# Patient Record
Sex: FEMALE | Race: BLACK OR AFRICAN AMERICAN | NOT HISPANIC OR LATINO | Employment: FULL TIME | URBAN - METROPOLITAN AREA
[De-identification: names, ages, dates, MRNs, and addresses within clinical notes are randomized per-mention and may not be internally consistent; named-entity substitution may affect disease eponyms.]

---

## 2023-11-27 PROBLEM — M51.369 DDD (DEGENERATIVE DISC DISEASE), LUMBAR: Status: ACTIVE | Noted: 2023-11-27

## 2024-04-19 ENCOUNTER — CLINICAL SUPPORT (OUTPATIENT)
Dept: FAMILY MEDICINE CLINIC | Facility: CLINIC | Age: 59
End: 2024-04-19
Payer: COMMERCIAL

## 2024-04-19 DIAGNOSIS — Z23 ENCOUNTER FOR IMMUNIZATION: Primary | ICD-10-CM

## 2024-06-07 ENCOUNTER — OFFICE VISIT (OUTPATIENT)
Dept: FAMILY MEDICINE CLINIC | Facility: CLINIC | Age: 59
End: 2024-06-07
Payer: COMMERCIAL

## 2024-06-07 VITALS
OXYGEN SATURATION: 96 % | BODY MASS INDEX: 29.22 KG/M2 | SYSTOLIC BLOOD PRESSURE: 102 MMHG | RESPIRATION RATE: 16 BRPM | DIASTOLIC BLOOD PRESSURE: 71 MMHG | HEART RATE: 77 BPM | WEIGHT: 158.8 LBS | HEIGHT: 62 IN

## 2024-06-07 DIAGNOSIS — R20.2 PARESTHESIA OF LEFT FOOT: ICD-10-CM

## 2024-06-07 DIAGNOSIS — M79.672 LEFT FOOT PAIN: Primary | ICD-10-CM

## 2024-06-07 DIAGNOSIS — R20.2 PARESTHESIA: ICD-10-CM

## 2024-06-07 DIAGNOSIS — R73.9 HYPERGLYCEMIA: ICD-10-CM

## 2024-06-07 DIAGNOSIS — M54.16 LUMBAR RADICULOPATHY: ICD-10-CM

## 2024-06-07 PROCEDURE — 99214 OFFICE O/P EST MOD 30 MIN: CPT | Performed by: FAMILY MEDICINE

## 2024-06-07 RX ORDER — MELOXICAM 15 MG/1
15 TABLET ORAL
COMMUNITY
Start: 2024-04-15 | End: 2024-06-14

## 2024-06-07 NOTE — PATIENT INSTRUCTIONS
As requested, I gave you the x-ray order.    I have ordered lab tests today.  You should receive a phone call or a [employer] message with those results.  If you have not heard from us in 7-10 days, please call the office.      Keep up your healthy diet and just watch portions.

## 2024-06-07 NOTE — PROGRESS NOTES
"Subjective     Jodie Desai is a 59 y.o. female who presents with   Chief Complaint   Patient presents with    Foot Pain     Hurts to walk        Foot Pain     Left foot at the base of the big toe are hurting when walking barefoot.  She also gets a sensation in the big toe as well.  She's also had issues with her back and is finishing physical therapy ([facility]) with a lot of improvement.  She's seeing Dr. Crowell (ortho) for that at  and really likes him.     She has warm flushing on the right side of her chest and also has to urinate when didn't feel it until just then.  She'll go pee and do some deep breathing and it goes away.     Hyperglycemia for many years and stable for years.  Eats a healthy diet to help control this.              Review of Systems     Objective     /71 (BP Location: Right arm, Patient Position: Sitting, Cuff Size: Adult)   Pulse 77   Resp 16   Ht 157.5 cm (62\")   Wt 72 kg (158 lb 12.8 oz)   SpO2 96%   Breastfeeding No   BMI 29.04 kg/m²     Physical Exam  Constitutional:       Appearance: Normal appearance.   Musculoskeletal:         General: Tenderness (at the first MCP on the left foot) and deformity (small bunions bilaterally and worse on the left) present.   Neurological:      Mental Status: She is alert.   Psychiatric:         Behavior: Behavior normal.         Thought Content: Thought content normal.         Procedures     Assessment & Plan   Diagnoses and all orders for this visit:    1. Left foot pain (Primary)  -     XR Foot 3+ View Left; Future    2. Lumbar radiculopathy  Assessment & Plan:  Finishing physical therapy with help.  Doing well with Dr. Crowell.      3. Paresthesia of left foot    4. Paresthesia  Assessment & Plan:  Right chest wall and related to urination    Orders:  -     Comprehensive Metabolic Panel  -     CBC & Differential  -     TSH  -     Vitamin B12    5. Hyperglycemia  -     Hemoglobin A1c       BMI is >= 25 and <30. " (Overweight) The following options were offered after discussion;: information on healthy weight added to patient's after visit summary      Discussion    Patient Instructions   As requested, I gave you the x-ray order.    I have ordered lab tests today.  You should receive a phone call or a [employer] message with those results.  If you have not heard from us in 7-10 days, please call the office.      Keep up your healthy diet and just watch portions.               Keyla Larsen MD

## 2024-06-08 LAB
ALBUMIN SERPL-MCNC: 4.5 G/DL (ref 3.5–5.2)
ALBUMIN/GLOB SERPL: 2.3 G/DL
ALP SERPL-CCNC: 101 U/L (ref 39–117)
ALT SERPL-CCNC: 13 U/L (ref 1–33)
AST SERPL-CCNC: 21 U/L (ref 1–32)
BASOPHILS # BLD AUTO: 0.07 10*3/MM3 (ref 0–0.2)
BASOPHILS NFR BLD AUTO: 1.1 % (ref 0–1.5)
BILIRUB SERPL-MCNC: 0.4 MG/DL (ref 0–1.2)
BUN SERPL-MCNC: 14 MG/DL (ref 6–20)
BUN/CREAT SERPL: 16.7 (ref 7–25)
CALCIUM SERPL-MCNC: 10.2 MG/DL (ref 8.6–10.5)
CHLORIDE SERPL-SCNC: 104 MMOL/L (ref 98–107)
CO2 SERPL-SCNC: 27.5 MMOL/L (ref 22–29)
CREAT SERPL-MCNC: 0.84 MG/DL (ref 0.57–1)
EGFRCR SERPLBLD CKD-EPI 2021: 81.2 ML/MIN/1.73
EOSINOPHIL # BLD AUTO: 0.16 10*3/MM3 (ref 0–0.4)
EOSINOPHIL NFR BLD AUTO: 2.6 % (ref 0.3–6.2)
ERYTHROCYTE [DISTWIDTH] IN BLOOD BY AUTOMATED COUNT: 13.5 % (ref 12.3–15.4)
GLOBULIN SER CALC-MCNC: 2 GM/DL
GLUCOSE SERPL-MCNC: 91 MG/DL (ref 65–99)
HBA1C MFR BLD: 5.9 % (ref 4.8–5.6)
HCT VFR BLD AUTO: 44.6 % (ref 34–46.6)
HGB BLD-MCNC: 14.5 G/DL (ref 12–15.9)
IMM GRANULOCYTES # BLD AUTO: 0.03 10*3/MM3 (ref 0–0.05)
IMM GRANULOCYTES NFR BLD AUTO: 0.5 % (ref 0–0.5)
LYMPHOCYTES # BLD AUTO: 2.23 10*3/MM3 (ref 0.7–3.1)
LYMPHOCYTES NFR BLD AUTO: 35.6 % (ref 19.6–45.3)
MCH RBC QN AUTO: 28.9 PG (ref 26.6–33)
MCHC RBC AUTO-ENTMCNC: 32.5 G/DL (ref 31.5–35.7)
MCV RBC AUTO: 88.8 FL (ref 79–97)
MONOCYTES # BLD AUTO: 0.63 10*3/MM3 (ref 0.1–0.9)
MONOCYTES NFR BLD AUTO: 10.1 % (ref 5–12)
NEUTROPHILS # BLD AUTO: 3.14 10*3/MM3 (ref 1.7–7)
NEUTROPHILS NFR BLD AUTO: 50.1 % (ref 42.7–76)
NRBC BLD AUTO-RTO: 0 /100 WBC (ref 0–0.2)
PLATELET # BLD AUTO: 372 10*3/MM3 (ref 140–450)
POTASSIUM SERPL-SCNC: 4.7 MMOL/L (ref 3.5–5.2)
PROT SERPL-MCNC: 6.5 G/DL (ref 6–8.5)
RBC # BLD AUTO: 5.02 10*6/MM3 (ref 3.77–5.28)
SODIUM SERPL-SCNC: 142 MMOL/L (ref 136–145)
TSH SERPL DL<=0.005 MIU/L-ACNC: 1.16 UIU/ML (ref 0.27–4.2)
VIT B12 SERPL-MCNC: 597 PG/ML (ref 211–946)
WBC # BLD AUTO: 6.26 10*3/MM3 (ref 3.4–10.8)

## 2024-06-18 ENCOUNTER — TELEPHONE (OUTPATIENT)
Dept: FAMILY MEDICINE CLINIC | Facility: CLINIC | Age: 59
End: 2024-06-18
Payer: OTHER GOVERNMENT

## 2024-06-18 NOTE — TELEPHONE ENCOUNTER
Pt called and said that her foot pain is continuing. It's more of a tingling sensation in her toes. She can't get in with her neurologist. Would you be able to send in a medication or recommend something else for her to do? She is currently trying the foot inserts as recommended and it's not helping much.

## 2024-06-18 NOTE — TELEPHONE ENCOUNTER
Please let her know that Dr. Larsen is out of office until 6/26. If not already done, I recommend checking nerve conduction study to help identify the reason for symptoms.

## 2024-07-23 ENCOUNTER — OFFICE VISIT (OUTPATIENT)
Dept: FAMILY MEDICINE CLINIC | Facility: CLINIC | Age: 59
End: 2024-07-23
Payer: COMMERCIAL

## 2024-07-23 VITALS
HEART RATE: 90 BPM | HEIGHT: 62 IN | OXYGEN SATURATION: 97 % | SYSTOLIC BLOOD PRESSURE: 114 MMHG | TEMPERATURE: 98.8 F | WEIGHT: 162 LBS | DIASTOLIC BLOOD PRESSURE: 64 MMHG | BODY MASS INDEX: 29.81 KG/M2

## 2024-07-23 DIAGNOSIS — R79.89 LOW VITAMIN D LEVEL: ICD-10-CM

## 2024-07-23 DIAGNOSIS — E78.2 MODERATE MIXED HYPERLIPIDEMIA NOT REQUIRING STATIN THERAPY: ICD-10-CM

## 2024-07-23 DIAGNOSIS — J06.9 ACUTE URI: Primary | ICD-10-CM

## 2024-07-23 LAB
EXPIRATION DATE: NORMAL
EXPIRATION DATE: NORMAL
FLUAV AG UPPER RESP QL IA.RAPID: NOT DETECTED
FLUBV AG UPPER RESP QL IA.RAPID: NOT DETECTED
INTERNAL CONTROL: NORMAL
INTERNAL CONTROL: NORMAL
Lab: 7933
Lab: NORMAL
S PYO AG THROAT QL: NEGATIVE
SARS-COV-2 AG UPPER RESP QL IA.RAPID: NOT DETECTED

## 2024-07-23 PROCEDURE — 99214 OFFICE O/P EST MOD 30 MIN: CPT | Performed by: FAMILY MEDICINE

## 2024-07-23 PROCEDURE — 87428 SARSCOV & INF VIR A&B AG IA: CPT | Performed by: FAMILY MEDICINE

## 2024-07-23 PROCEDURE — 87880 STREP A ASSAY W/OPTIC: CPT | Performed by: FAMILY MEDICINE

## 2024-07-23 RX ORDER — DEXTROMETHORPHAN HYDROBROMIDE AND PROMETHAZINE HYDROCHLORIDE 15; 6.25 MG/5ML; MG/5ML
5 SYRUP ORAL 4 TIMES DAILY PRN
Qty: 240 ML | Refills: 0 | Status: SHIPPED | OUTPATIENT
Start: 2024-07-23

## 2024-07-23 RX ORDER — BENZONATATE 200 MG/1
200 CAPSULE ORAL 3 TIMES DAILY PRN
Qty: 30 CAPSULE | Refills: 0 | Status: SHIPPED | OUTPATIENT
Start: 2024-07-23

## 2024-07-23 NOTE — PATIENT INSTRUCTIONS
Upper respiratory infection plan:  - nasal congestion relief with OTC  mucinex, sinus rinse, use of steam shower or humidifier  - sore throat relief with OTC cough drops, spoonfuls of honey, salt water gargle, throat coat tea, hot honey lemon water  - cough relief with OTC cough drops, honey, use of steam shower or humidifier; RX tessalon perles during the day and promethazine cough syrup at night  - pain/inflammation relief with OTC ibuprofen 400mg every 4 hrs with food/water, tylenol 1000mg every 6 hrs with food/water  - you may consider taking elderberry syrup or gummies or zinc supplement to boost immune system  - increase hydration  - Red flags: new fever (100.4+), inability to swallow, one sided severe facial pain or dental pain, new shortness of breath, new chest pain not associated with coughing, blood in phlegm

## 2024-07-23 NOTE — LETTER
July 23, 2024     Patient: Jodie Desai   YOB: 1965   Date of Visit: 7/23/2024       To Whom It May Concern:    It is my medical opinion that Jodie Desai may return to work tomorrow.            Sincerely,        Irlanda Smith MD    CC: No Recipients

## 2024-07-23 NOTE — PROGRESS NOTES
"Chief Complaint  Chief Complaint   Patient presents with    Cough     Sore throat, 3 days        Subjective    History of Present Illness  Jodie Desai is a 59 y.o. female presents to [facility]    History of Present Illness  The patient presents for evaluation of sore throat and cough.    The patient's symptoms initiated on Sunday, characterized by a sore throat, cough, and drainage. She denies experiencing any ear pain, but reports pruritus and tightness in her right nostril. She denies experiencing headaches, shortness of breath, nausea, vomiting, abdominal pain, diarrhea, fever, or chills. She reports mild chest pain when coughing, but denies any hemoptysis. Her appetite remains unaffected, and she has been consuming ginger ale and water. She denies any recent exposure to sick individuals, recent travel, gatherings, or weddings. Her treatment regimen includes Mucinex Cough and lozenges.      Objective   Vitals:    07/23/24 1505   BP: 114/64   Pulse: 90   Temp: 98.8 °F (37.1 °C)   SpO2: 97%   Weight: 73.5 kg (162 lb)   Height: 157.5 cm (62.01\")        Physical Exam  Constitutional:       General: She is not in acute distress.     Appearance: Normal appearance. She is normal weight. She is not ill-appearing.   HENT:      Head: Normocephalic and atraumatic.      Right Ear: Ear canal and external ear normal.      Left Ear: Ear canal and external ear normal.      Ears:      Comments: Clear fluid bilat TM, no bulge or erythema     Nose:      Comments: Slight L frontal sinus tenderness to percussion, no maxillary sinus tenderness to percussion     Mouth/Throat:      Mouth: Mucous membranes are moist.      Pharynx: Posterior oropharyngeal erythema (posterior oropharynx) present. No oropharyngeal exudate.      Comments: + PND  Eyes:      Extraocular Movements: Extraocular movements intact.      Conjunctiva/sclera: Conjunctivae normal.      Pupils: Pupils are equal, round, and " reactive to light.   Pulmonary:      Effort: Pulmonary effort is normal. No respiratory distress.      Breath sounds: Normal breath sounds. No wheezing.      Comments: No crackles  Musculoskeletal:         General: Normal range of motion.      Cervical back: Normal range of motion. No rigidity or tenderness.   Lymphadenopathy:      Cervical: Cervical adenopathy (nontender left cervical LAD) present.   Skin:     General: Skin is warm and dry.      Capillary Refill: Capillary refill takes less than 2 seconds.      Findings: No rash.   Neurological:      General: No focal deficit present.      Mental Status: She is alert and oriented to person, place, and time.   Psychiatric:         Mood and Affect: Mood normal.         Behavior: Behavior normal.         Thought Content: Thought content normal.         Judgment: Judgment normal.          The following data was reviewed by: Irlanda Smith MD on 07/23/2024:  Lab Results   Component Value Date    RAPSCRN Negative 07/23/2024   POCT SARS-CoV-2 Antigen MJ + Flu  Order: [id number]  Status: Final result       Visible to patient: No (scheduled for 7/23/2024  4:35 PM)       Next appt: 08/20/2024 at 03:15 PM in Family Medicine (Keyla Larsen MD)       Dx: Acute URI    Specimen Information: Swab   0 Result Notes       Component  Ref Range & Units 15:35 15:31   SARS Antigen  Not Detected, Presumptive Negative Not Detected    Influenza A Antigen MJ  Not Detected Not Detected    Influenza B Antigen MJ  Not Detected Not Detected           Assessment and Plan  Jodie Desai is a 59 y.o. female presents to [facility] today for 3 days URI symptoms.  Negative strep and COVID in clinic.  Based on short duration of symptoms, do not suspect bacterial sinusitis at this time. Will treat supportive care at this time-Tessalon Perles and promethazine cough syrup at night for cough; AVS with other supportive care strategies.  RTC as  needed.        Diagnoses and all orders for this visit:    1. Acute URI (Primary)  -     POCT SARS-CoV-2 Antigen MJ + Flu  -     POCT rapid strep A  -     benzonatate (TESSALON) 200 MG capsule; Take 1 capsule by mouth 3 (Three) Times a Day As Needed for Cough.  Dispense: 30 capsule; Refill: 0  -     promethazine-dextromethorphan (PROMETHAZINE-DM) 6.25-15 MG/5ML syrup; Take 5 mL by mouth 4 (Four) Times a Day As Needed for Cough. Indications: Cough  Dispense: 240 mL; Refill: 0    2. Low vitamin D level  -     Vitamin D,25-Hydroxy    3. Moderate mixed hyperlipidemia not requiring statin therapy  -     Lipid Panel        Patient voiced understanding and agreement with plan of care and had no further questions or concerns at this time.     Problems addressed:  single self-limited or minor problem, established problem: stable  Complexity: labs ordered yes, labs reviewed yes  Risk: prescription drug management    Irlanda Smith MD  Family Medicine  [facility]      Follow Up  Return for Next scheduled follow up.    Patient Instructions   Upper respiratory infection plan:  - nasal congestion relief with OTC  mucinex, sinus rinse, use of steam shower or humidifier  - sore throat relief with OTC cough drops, spoonfuls of honey, salt water gargle, throat coat tea, hot honey lemon water  - cough relief with OTC cough drops, honey, use of steam shower or humidifier; RX tessalon perles during the day and promethazine cough syrup at night  - pain/inflammation relief with OTC ibuprofen 400mg every 4 hrs with food/water, tylenol 1000mg every 6 hrs with food/water  - you may consider taking elderberry syrup or gummies or zinc supplement to boost immune system  - increase hydration  - Red flags: new fever (100.4+), inability to swallow, one sided severe facial pain or dental pain, new shortness of breath, new chest pain not associated with coughing, blood in phlegm       Patient or patient representative verbalized  consent for the use of Ambient Listening during the visit with  Irlanda Smith MD for chart documentation. 7/23/2024  16:02 EDT

## 2024-07-24 LAB
25(OH)D3+25(OH)D2 SERPL-MCNC: 22.4 NG/ML (ref 30–100)
CHOLEST SERPL-MCNC: 192 MG/DL (ref 0–200)
HDLC SERPL-MCNC: 58 MG/DL (ref 40–60)
LDLC SERPL CALC-MCNC: 120 MG/DL (ref 0–100)
TRIGL SERPL-MCNC: 76 MG/DL (ref 0–150)
VLDLC SERPL CALC-MCNC: 14 MG/DL (ref 5–40)

## 2024-07-25 NOTE — PROGRESS NOTES
Hello!    Here are the results of your most recent labs:    Your vitamin D is still low. I recommend supplementing with OTC vitamin D 1000 IU daily for 3-6 months, then take 400 IU daily or a prenatal vitamin.     Your cholesterol was elevated.  For diet and lifestyle intervention, I recommend decreasing intake of trans and saturated fats, and red meat.  Increase physical activity as tolerated.  You may try supplementing with omega-3 1-2g daily, berberine 500mg daily, and/or whole flaxseed if desired.    ASCVD risk: The 10-year ASCVD risk score (Carson GORDON, et al., 2019) is: 2.5%    Values used to calculate the score:      Age: 59 years      Sex: Female      Is Non- : Yes      Diabetic: No      Tobacco smoker: No      Systolic Blood Pressure: 114 mmHg      Is BP treated: No      HDL Cholesterol: 58 mg/dL      Total Cholesterol: 192 mg/dL    At this time a statin is not recommended.     Please continue your current medications.  Please contact me with any questions.    Thank you!  Dr. Smith

## 2024-10-15 ENCOUNTER — TELEPHONE (OUTPATIENT)
Dept: FAMILY MEDICINE CLINIC | Facility: CLINIC | Age: 59
End: 2024-10-15
Payer: OTHER GOVERNMENT

## 2024-10-15 NOTE — TELEPHONE ENCOUNTER
"She came in for a physical with Dr. Domingo on 10/15/24. Advised this is not corrected because she is a [facility] patient and physical are only scheduled with their PCP. The appointment is now scheduled in June with Dr. Larsen. She was upset also because if was scheduled pap and she never has her pap done here.I did tell her that can be corrected. The 10/15/24 appt was cancelled by the , because the patient scheduled it incorrectly through [employer]. She just kept going on and on about she never got any notification about the appt be cancelled. It was still on her phone calendar and she missed 2 hours of work. I did advise her notification are sent via [employer], email, and/or text. Stated she should not have to check that. Refused to move out of the way to let other patient check in for her appointment, and stated \"like always since becoming [facility] every time she comes in things are always messed up\".     "

## 2025-02-05 ENCOUNTER — OFFICE VISIT (OUTPATIENT)
Dept: FAMILY MEDICINE CLINIC | Facility: CLINIC | Age: 60
End: 2025-02-05
Payer: COMMERCIAL

## 2025-02-05 VITALS
HEART RATE: 92 BPM | SYSTOLIC BLOOD PRESSURE: 120 MMHG | WEIGHT: 167.9 LBS | OXYGEN SATURATION: 99 % | DIASTOLIC BLOOD PRESSURE: 76 MMHG | BODY MASS INDEX: 30.9 KG/M2 | HEIGHT: 62 IN

## 2025-02-05 DIAGNOSIS — J02.9 PHARYNGITIS, UNSPECIFIED ETIOLOGY: Primary | ICD-10-CM

## 2025-02-05 LAB
EXPIRATION DATE: NORMAL
INTERNAL CONTROL: NORMAL
Lab: NORMAL
S PYO AG THROAT QL: NORMAL

## 2025-02-05 PROCEDURE — 99213 OFFICE O/P EST LOW 20 MIN: CPT | Performed by: FAMILY MEDICINE

## 2025-02-05 PROCEDURE — 87880 STREP A ASSAY W/OPTIC: CPT | Performed by: FAMILY MEDICINE

## 2025-02-05 RX ORDER — FLUTICASONE PROPIONATE 50 MCG
2 SPRAY, SUSPENSION (ML) NASAL DAILY
Qty: 30 G | Refills: 3 | Status: SHIPPED | OUTPATIENT
Start: 2025-02-05

## 2025-02-05 NOTE — LETTER
February 5, 2025     Patient: Jodie Desai   YOB: 1965   Date of Visit: 2/5/2025       To Whom It May Concern:     Jodie Desai was seen today for illness.         Sincerely,        Keyla Larsen MD    CC: No Recipients

## 2025-02-05 NOTE — PATIENT INSTRUCTIONS
Salt water gargles and Mucinex may help with congestion and drainage.  Continue to use Tylenol Aleve, Astelin and Flonase.  Most viral respiratory infections will run their course in 7 to 10 days.

## 2025-02-05 NOTE — PROGRESS NOTES
"Subjective     Jodie Desai is a 59 y.o. female who presents with   Chief Complaint   Patient presents with    Sore Throat     Going into ear, since Sunday        Sore Throat          Sudden onset of chills and fatigue 4 days ago and the following morning started having congestion and headache.  Using Tylenol, Aleve and Flonase.  She's not eating and does not have an appetite.   She took a home covid and flu test that were negative 3 days ago.  She has some itch in her throat and ear and some peeling at the roof of her mouth.              Review of Systems   HENT:  Positive for sore throat.         Objective     /76   Pulse 92   Ht 157.5 cm (62\")   Wt 76.2 kg (167 lb 14.4 oz)   SpO2 99%   Breastfeeding No   BMI 30.71 kg/m²     Physical Exam  Constitutional:       Appearance: Normal appearance.   HENT:      Right Ear: Tympanic membrane normal.      Left Ear: Tympanic membrane normal.      Nose: Congestion present.      Mouth/Throat:      Pharynx: Posterior oropharyngeal erythema present.   Pulmonary:      Effort: Pulmonary effort is normal.      Breath sounds: Normal breath sounds.   Musculoskeletal:      Cervical back: Tenderness (left submandibular nodes) present.   Neurological:      Mental Status: She is alert.   Psychiatric:         Behavior: Behavior normal.         Procedures     Assessment & Plan   Diagnoses and all orders for this visit:    1. Pharyngitis, unspecified etiology (Primary)  -     POCT rapid strep A    Other orders  -     fluticasone (FLONASE) 50 MCG/ACT nasal spray; Administer 2 sprays into the nostril(s) as directed by provider Daily.  Dispense: 30 g; Refill: 3             Discussion    Patient Instructions   Salt water gargles and Mucinex may help with congestion and drainage.  Continue to use Tylenol Aleve, Astelin and Flonase.  Most viral respiratory infections will run their course in 7 to 10 days.              Keyla Larsen MD           "

## 2025-06-10 ENCOUNTER — OFFICE VISIT (OUTPATIENT)
Dept: FAMILY MEDICINE CLINIC | Facility: CLINIC | Age: 60
End: 2025-06-10
Payer: COMMERCIAL

## 2025-06-10 VITALS
DIASTOLIC BLOOD PRESSURE: 60 MMHG | HEART RATE: 76 BPM | OXYGEN SATURATION: 96 % | WEIGHT: 163 LBS | HEIGHT: 62 IN | BODY MASS INDEX: 30 KG/M2 | SYSTOLIC BLOOD PRESSURE: 120 MMHG

## 2025-06-10 DIAGNOSIS — Z23 ENCOUNTER FOR VACCINATION: ICD-10-CM

## 2025-06-10 DIAGNOSIS — M79.605 BILATERAL LEG PAIN: ICD-10-CM

## 2025-06-10 DIAGNOSIS — Z00.00 ANNUAL PHYSICAL EXAM: Primary | ICD-10-CM

## 2025-06-10 DIAGNOSIS — Z12.11 SCREENING FOR COLON CANCER: ICD-10-CM

## 2025-06-10 DIAGNOSIS — R20.2 PARESTHESIA: ICD-10-CM

## 2025-06-10 DIAGNOSIS — Z13.220 NEED FOR LIPID SCREENING: ICD-10-CM

## 2025-06-10 DIAGNOSIS — M79.604 BILATERAL LEG PAIN: ICD-10-CM

## 2025-06-10 DIAGNOSIS — Z12.31 SCREENING MAMMOGRAM, ENCOUNTER FOR: ICD-10-CM

## 2025-06-10 DIAGNOSIS — R73.9 HYPERGLYCEMIA: ICD-10-CM

## 2025-06-10 DIAGNOSIS — L40.0 PSORIASIS VULGARIS: ICD-10-CM

## 2025-06-10 PROBLEM — B02.9 ZOSTER WITHOUT COMPLICATIONS: Status: ACTIVE | Noted: 2021-01-21

## 2025-06-10 RX ORDER — CELECOXIB 100 MG/1
100 CAPSULE ORAL 2 TIMES DAILY
COMMUNITY
Start: 2025-03-07

## 2025-06-10 RX ORDER — CYCLOBENZAPRINE HCL 10 MG
10 TABLET ORAL
COMMUNITY
Start: 2025-03-12

## 2025-06-10 RX ORDER — PREGABALIN 50 MG/1
50 CAPSULE ORAL
COMMUNITY
Start: 2025-05-09

## 2025-06-10 RX ORDER — FLUOCINOLONE ACETONIDE 0.11 MG/ML
OIL TOPICAL
COMMUNITY
Start: 2025-06-02

## 2025-06-10 NOTE — PROGRESS NOTES
"Chief Complaint  Annual Exam    Subjective    {CC  Problem List  Visit  Diagnosis   Encounters  Notes  Medications  Labs  Result Review Imaging  Media :23}     Jodie Desai presents to [facility] [place] for Annual Exam.    History of Present Illness     She's having problems with her legs.  It's both sides but not at the same time. She's seeing ortho (Dr. Crowell) who says it's her back.  She's had 2 epidurals and they suggested a third.  She's done physical therapy and it helped her back. She's seen Dr. Nguyen (PMR) for these injections.   She's had a lumbar spine MRI and imaging of her hips.  She doesn't feel like this is her back.  It's her legs that bother her when she goes to bed at night.  She did see a podiatrist even.  She's trying to get a rheumatology consult.  She's planning a second opinion with Dr. Marie.   She has seen Dr. Samayoa neurosurgery who suggested L3/4 epidural and she's had L5/S1.   She is benefiting from the Lyrica.      She hasn't had a seizure since 2019. She did pass out 5/15 and thinks the exhaustion and dehydration were the issue.  She sees Cassy MERCADO.   She's on lamotrigine.     She's seeing a therapist for counseling.     Psoriasis on Skyrizi.     Last pap smear: hysterectomy 2018  Last mammogram: January 2024  DEXA: not indicated  Last colonoscopy: 5/30/2017 with Dr. Chin and overdue  Ever screened for Hepatitis C: yes  Vaccines: eligible for pneumonia and covid  Exercise: 30 minutes a day walking.   Smoking status: never  Alcohol use: 1-2 a week     with two grown children.  Her daughter just finished her doctorate in history and her son has finished his masters in psychology. 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     Review of Systems     Objective       Vital Signs:   /60   Pulse 76   Ht 157.5 cm (62.01\")   Wt 73.9 kg (163 lb)   SpO2 96%   BMI 29.81 kg/m²     Body mass index is 29.81 kg/m².      PHQ-9 Total " Score:      Physical Exam  Constitutional:       General: She is not in acute distress.     Appearance: Normal appearance.   HENT:      Head: Normocephalic and atraumatic.      Nose: Nose normal.   Eyes:      Conjunctiva/sclera: Conjunctivae normal.      Pupils: Pupils are equal, round, and reactive to light.   Cardiovascular:      Rate and Rhythm: Normal rate and regular rhythm.      Heart sounds: Normal heart sounds.   Pulmonary:      Effort: Pulmonary effort is normal.      Breath sounds: Normal breath sounds.   Abdominal:      General: Bowel sounds are normal.      Palpations: Abdomen is soft.   Musculoskeletal:      Cervical back: Neck supple.   Skin:     General: Skin is warm.   Neurological:      General: No focal deficit present.      Mental Status: She is alert.      Gait: Gait normal.   Psychiatric:         Behavior: Behavior normal.          Result Review  Data Reviewed:{ Labs  Result Review  Imaging  Med Tab  Media :23}             Discussed healthy diet, exercise, adequate sleep, cancer screening, immunizations and preventative care. Annual eye exam and routine dental cleaning encouraged.        Assessment and Plan {CC Problem List  Visit Diagnosis  ROS  Review (Popup)  Health Maintenance  Quality  BestPractice  Medications  SmartSets  SnapShot Encounters  Media :23}   Diagnoses and all orders for this visit:    1. Annual physical exam (Primary)    2. Hyperglycemia  -     Hemoglobin A1c    3. Psoriasis vulgaris  Assessment & Plan:  On Skirizi      4. Paresthesia    5. Bilateral leg pain  Assessment & Plan:  She's seeing Wendy Guadarrama and Yao and on Lyrica.     Orders:  -     Cyclic Citrul Peptide Antibody, IgG / IgA  -     C-reactive Protein  -     EDGAR With / DsDNA, RNP, Sjogrens A / B, Sotelo  -     EDGAR by IFA, Reflex to Titer and Pattern  -     Rheumatoid Factor  -     Sedimentation Rate    6. Need for lipid screening  -     Comprehensive Metabolic Panel  -     Lipid Panel    7.  Screening for colon cancer  -     Ambulatory Referral For Screening Colonoscopy    8. Screening mammogram, encounter for  -     Mammo Screening Digital Tomosynthesis Bilateral With CAD; Future    9. Encounter for vaccination  -     Pneumococcal Conjugate Vaccine 21 (18+ yrs); Future      BMI is >= 25 and <30. (Overweight) The following options were offered after discussion;: exercise counseling/recommendations       Patient Instructions   I have ordered lab tests today.  You should receive a phone call or a [employer] message with those results.  If you have not heard from us in 7-10 days, please call the office.      I would follow through with the L3/4 epidural idea from Dr. Samayoa    I ordered your mammogram at [place] as requested    I did do autoimmune labs and if you need us to send them to Dr. Philippe in [place].     Aim for 150 minutes of moderate exercise in a week.     I placed an order for your pneumonia vaccine.          Return in about 1 year (around 6/10/2026).    Keyla Larsen MD

## 2025-06-10 NOTE — PATIENT INSTRUCTIONS
I have ordered lab tests today.  You should receive a phone call or a [employer] message with those results.  If you have not heard from us in 7-10 days, please call the office.      I would follow through with the L3/4 epidural idea from Dr. Samayoa    I ordered your mammogram at [place] as requested    I did do autoimmune labs and if you need us to send them to Dr. Philippe in [place].     Aim for 150 minutes of moderate exercise in a week.     I placed an order for your pneumonia vaccine.

## 2025-06-12 LAB
ALBUMIN SERPL-MCNC: 4.4 G/DL (ref 3.8–4.9)
ALP SERPL-CCNC: 116 IU/L (ref 44–121)
ALT SERPL-CCNC: 13 IU/L (ref 0–32)
ANA SER QL IF: NEGATIVE
ANA SER QL: NEGATIVE
AST SERPL-CCNC: 22 IU/L (ref 0–40)
BILIRUB SERPL-MCNC: 0.4 MG/DL (ref 0–1.2)
BUN SERPL-MCNC: 11 MG/DL (ref 6–24)
BUN/CREAT SERPL: 14 (ref 9–23)
CALCIUM SERPL-MCNC: 10.3 MG/DL (ref 8.7–10.2)
CCP IGA+IGG SERPL IA-ACNC: 8 UNITS (ref 0–19)
CHLORIDE SERPL-SCNC: 103 MMOL/L (ref 96–106)
CHOLEST SERPL-MCNC: 190 MG/DL (ref 100–199)
CO2 SERPL-SCNC: 24 MMOL/L (ref 20–29)
CREAT SERPL-MCNC: 0.8 MG/DL (ref 0.57–1)
CRP SERPL-MCNC: 3 MG/L (ref 0–10)
EGFRCR SERPLBLD CKD-EPI 2021: 85 ML/MIN/1.73
ERYTHROCYTE [SEDIMENTATION RATE] IN BLOOD BY WESTERGREN METHOD: 2 MM/HR (ref 0–40)
GLOBULIN SER CALC-MCNC: 2.1 G/DL (ref 1.5–4.5)
GLUCOSE SERPL-MCNC: 93 MG/DL (ref 70–99)
HBA1C MFR BLD: 5.8 % (ref 4.8–5.6)
HDLC SERPL-MCNC: 63 MG/DL
LDLC SERPL CALC-MCNC: 116 MG/DL (ref 0–99)
POTASSIUM SERPL-SCNC: 4.4 MMOL/L (ref 3.5–5.2)
PROT SERPL-MCNC: 6.5 G/DL (ref 6–8.5)
RHEUMATOID FACT SERPL-ACNC: <10 IU/ML
SODIUM SERPL-SCNC: 142 MMOL/L (ref 134–144)
TRIGL SERPL-MCNC: 57 MG/DL (ref 0–149)
VLDLC SERPL CALC-MCNC: 11 MG/DL (ref 5–40)

## 2025-06-23 ENCOUNTER — CLINICAL SUPPORT (OUTPATIENT)
Dept: FAMILY MEDICINE CLINIC | Facility: CLINIC | Age: 60
End: 2025-06-23
Payer: COMMERCIAL

## 2025-06-23 DIAGNOSIS — Z23 ENCOUNTER FOR VACCINATION: ICD-10-CM

## 2025-06-23 PROCEDURE — 90471 IMMUNIZATION ADMIN: CPT | Performed by: FAMILY MEDICINE

## 2025-06-23 PROCEDURE — 90684 PCV21 VACCINE IM: CPT | Performed by: FAMILY MEDICINE

## 2025-06-23 NOTE — PROGRESS NOTES
Injection  Injection performed in right deltoid by Cyndi Sotelo MA. Patient tolerated the procedure well without complications.  06/23/25   Cyndi Sotelo MA

## 2025-06-30 ENCOUNTER — PATIENT MESSAGE (OUTPATIENT)
Dept: FAMILY MEDICINE CLINIC | Facility: CLINIC | Age: 60
End: 2025-06-30
Payer: COMMERCIAL

## 2025-08-07 RX ORDER — FLUTICASONE PROPIONATE 50 MCG
2 SPRAY, SUSPENSION (ML) NASAL DAILY
Qty: 16 G | Refills: 12 | Status: SHIPPED | OUTPATIENT
Start: 2025-08-07

## 2025-08-11 ENCOUNTER — TELEPHONE (OUTPATIENT)
Dept: FAMILY MEDICINE CLINIC | Facility: CLINIC | Age: 60
End: 2025-08-11
Payer: COMMERCIAL

## 2025-08-11 DIAGNOSIS — Z13.6 ENCOUNTER FOR SCREENING FOR CORONARY ARTERY DISEASE: Primary | ICD-10-CM

## 2025-08-14 ENCOUNTER — OFFICE VISIT (OUTPATIENT)
Dept: FAMILY MEDICINE CLINIC | Facility: CLINIC | Age: 60
End: 2025-08-14
Payer: COMMERCIAL

## 2025-08-14 VITALS
SYSTOLIC BLOOD PRESSURE: 113 MMHG | BODY MASS INDEX: 29.77 KG/M2 | WEIGHT: 161.8 LBS | OXYGEN SATURATION: 96 % | HEART RATE: 73 BPM | TEMPERATURE: 99 F | DIASTOLIC BLOOD PRESSURE: 75 MMHG | HEIGHT: 62 IN

## 2025-08-14 DIAGNOSIS — M48.07 SPINAL STENOSIS OF LUMBOSACRAL REGION: Primary | ICD-10-CM

## 2025-08-14 DIAGNOSIS — R25.2 MUSCLE CRAMP, NOCTURNAL: ICD-10-CM

## 2025-08-14 DIAGNOSIS — R20.2 PARESTHESIA: ICD-10-CM

## 2025-08-14 DIAGNOSIS — Z87.898 HISTORY OF BRUISING EASILY: ICD-10-CM

## 2025-08-14 PROCEDURE — 99214 OFFICE O/P EST MOD 30 MIN: CPT | Performed by: FAMILY MEDICINE

## 2025-08-14 RX ORDER — ACETAMINOPHEN 500 MG
500 TABLET ORAL AS NEEDED
COMMUNITY

## 2025-08-15 ENCOUNTER — RESULTS FOLLOW-UP (OUTPATIENT)
Dept: FAMILY MEDICINE CLINIC | Facility: CLINIC | Age: 60
End: 2025-08-15
Payer: COMMERCIAL

## 2025-08-15 LAB
ALBUMIN SERPL-MCNC: 4.5 G/DL (ref 3.8–4.9)
ALP SERPL-CCNC: 101 IU/L (ref 44–121)
ALT SERPL-CCNC: 14 IU/L (ref 0–32)
AST SERPL-CCNC: 22 IU/L (ref 0–40)
BASOPHILS # BLD AUTO: 0.1 X10E3/UL (ref 0–0.2)
BASOPHILS NFR BLD AUTO: 1 %
BILIRUB SERPL-MCNC: 0.3 MG/DL (ref 0–1.2)
BUN SERPL-MCNC: 14 MG/DL (ref 6–24)
BUN/CREAT SERPL: 14 (ref 9–23)
CALCIUM SERPL-MCNC: 10.2 MG/DL (ref 8.7–10.2)
CHLORIDE SERPL-SCNC: 103 MMOL/L (ref 96–106)
CO2 SERPL-SCNC: 22 MMOL/L (ref 20–29)
CREAT SERPL-MCNC: 1 MG/DL (ref 0.57–1)
EGFRCR SERPLBLD CKD-EPI 2021: 65 ML/MIN/1.73
EOSINOPHIL # BLD AUTO: 0.1 X10E3/UL (ref 0–0.4)
EOSINOPHIL NFR BLD AUTO: 1 %
ERYTHROCYTE [DISTWIDTH] IN BLOOD BY AUTOMATED COUNT: 12.8 % (ref 11.7–15.4)
GLOBULIN SER CALC-MCNC: 2.2 G/DL (ref 1.5–4.5)
GLUCOSE SERPL-MCNC: 76 MG/DL (ref 70–99)
HCT VFR BLD AUTO: 43.1 % (ref 34–46.6)
HGB BLD-MCNC: 13.9 G/DL (ref 11.1–15.9)
IMM GRANULOCYTES # BLD AUTO: 0 X10E3/UL (ref 0–0.1)
IMM GRANULOCYTES NFR BLD AUTO: 0 %
LYMPHOCYTES # BLD AUTO: 2.9 X10E3/UL (ref 0.7–3.1)
LYMPHOCYTES NFR BLD AUTO: 29 %
MAGNESIUM SERPL-MCNC: 2.1 MG/DL (ref 1.6–2.3)
MCH RBC QN AUTO: 29.2 PG (ref 26.6–33)
MCHC RBC AUTO-ENTMCNC: 32.3 G/DL (ref 31.5–35.7)
MCV RBC AUTO: 91 FL (ref 79–97)
MONOCYTES # BLD AUTO: 0.7 X10E3/UL (ref 0.1–0.9)
MONOCYTES NFR BLD AUTO: 7 %
NEUTROPHILS # BLD AUTO: 6.2 X10E3/UL (ref 1.4–7)
NEUTROPHILS NFR BLD AUTO: 62 %
PLATELET # BLD AUTO: 335 X10E3/UL (ref 150–450)
POTASSIUM SERPL-SCNC: 4.4 MMOL/L (ref 3.5–5.2)
PROT SERPL-MCNC: 6.7 G/DL (ref 6–8.5)
RBC # BLD AUTO: 4.76 X10E6/UL (ref 3.77–5.28)
SODIUM SERPL-SCNC: 141 MMOL/L (ref 134–144)
T3FREE SERPL-MCNC: 3.3 PG/ML (ref 2–4.4)
T4 FREE SERPL-MCNC: 1.19 NG/DL (ref 0.82–1.77)
THYROPEROXIDASE AB SERPL-ACNC: 13 IU/ML (ref 0–34)
TSH SERPL DL<=0.005 MIU/L-ACNC: 0.99 UIU/ML (ref 0.45–4.5)
VIT B12 SERPL-MCNC: 524 PG/ML (ref 232–1245)
WBC # BLD AUTO: 10 X10E3/UL (ref 3.4–10.8)

## 2025-08-28 ENCOUNTER — PATIENT MESSAGE (OUTPATIENT)
Dept: FAMILY MEDICINE CLINIC | Facility: CLINIC | Age: 60
End: 2025-08-28
Payer: COMMERCIAL

## 2025-08-28 ENCOUNTER — TELEPHONE (OUTPATIENT)
Dept: FAMILY MEDICINE CLINIC | Facility: CLINIC | Age: 60
End: 2025-08-28
Payer: COMMERCIAL

## 2025-08-28 DIAGNOSIS — M48.07 SPINAL STENOSIS OF LUMBOSACRAL REGION: Primary | ICD-10-CM

## 2025-08-28 DIAGNOSIS — M79.604 BILATERAL LEG PAIN: ICD-10-CM

## 2025-08-28 DIAGNOSIS — R20.2 PARESTHESIA OF LEFT FOOT: ICD-10-CM

## 2025-08-28 DIAGNOSIS — M79.605 BILATERAL LEG PAIN: ICD-10-CM
